# Patient Record
Sex: MALE | Race: BLACK OR AFRICAN AMERICAN | NOT HISPANIC OR LATINO | Employment: UNEMPLOYED | URBAN - METROPOLITAN AREA
[De-identification: names, ages, dates, MRNs, and addresses within clinical notes are randomized per-mention and may not be internally consistent; named-entity substitution may affect disease eponyms.]

---

## 2019-11-27 PROBLEM — R91.8 LUNG MASS: Status: ACTIVE | Noted: 2019-11-27

## 2019-11-27 PROBLEM — Z72.0 TOBACCO ABUSE: Status: ACTIVE | Noted: 2019-11-27

## 2019-11-27 PROBLEM — R45.851 SUICIDAL IDEATION: Status: ACTIVE | Noted: 2019-11-27

## 2019-11-27 PROBLEM — R91.8 MASS OF MIDDLE LOBE OF RIGHT LUNG: Status: ACTIVE | Noted: 2019-11-27

## 2019-11-27 PROBLEM — F10.10 ALCOHOL ABUSE: Status: ACTIVE | Noted: 2019-11-27

## 2019-11-28 VITALS
TEMPERATURE: 99 F | HEART RATE: 60 BPM | RESPIRATION RATE: 16 BRPM | OXYGEN SATURATION: 98 % | SYSTOLIC BLOOD PRESSURE: 169 MMHG | DIASTOLIC BLOOD PRESSURE: 90 MMHG

## 2019-11-28 PROBLEM — K43.9 VENTRAL HERNIA WITHOUT OBSTRUCTION OR GANGRENE: Status: ACTIVE | Noted: 2019-11-28

## 2019-11-28 PROBLEM — R79.89 ABNORMAL LIVER FUNCTION TESTS: Status: ACTIVE | Noted: 2019-11-28

## 2019-11-28 PROBLEM — D69.6 THROMBOCYTOPENIA: Status: ACTIVE | Noted: 2019-11-28

## 2019-11-29 VITALS
RESPIRATION RATE: 16 BRPM | OXYGEN SATURATION: 100 % | HEART RATE: 71 BPM | SYSTOLIC BLOOD PRESSURE: 155 MMHG | DIASTOLIC BLOOD PRESSURE: 82 MMHG | TEMPERATURE: 98 F

## 2019-11-29 VITALS
DIASTOLIC BLOOD PRESSURE: 89 MMHG | HEART RATE: 61 BPM | SYSTOLIC BLOOD PRESSURE: 161 MMHG | RESPIRATION RATE: 16 BRPM | TEMPERATURE: 99 F | OXYGEN SATURATION: 95 %

## 2019-11-29 VITALS
OXYGEN SATURATION: 100 % | DIASTOLIC BLOOD PRESSURE: 85 MMHG | HEART RATE: 69 BPM | TEMPERATURE: 98 F | SYSTOLIC BLOOD PRESSURE: 136 MMHG | RESPIRATION RATE: 14 BRPM

## 2019-11-30 VITALS
OXYGEN SATURATION: 100 % | HEART RATE: 66 BPM | TEMPERATURE: 98 F | SYSTOLIC BLOOD PRESSURE: 159 MMHG | DIASTOLIC BLOOD PRESSURE: 82 MMHG | RESPIRATION RATE: 16 BRPM

## 2019-11-30 VITALS
SYSTOLIC BLOOD PRESSURE: 150 MMHG | DIASTOLIC BLOOD PRESSURE: 74 MMHG | HEART RATE: 59 BPM | RESPIRATION RATE: 14 BRPM | OXYGEN SATURATION: 100 % | HEART RATE: 75 BPM | TEMPERATURE: 98 F | OXYGEN SATURATION: 100 % | RESPIRATION RATE: 14 BRPM | DIASTOLIC BLOOD PRESSURE: 70 MMHG | TEMPERATURE: 98 F | SYSTOLIC BLOOD PRESSURE: 134 MMHG

## 2019-11-30 VITALS
TEMPERATURE: 98 F | OXYGEN SATURATION: 98 % | SYSTOLIC BLOOD PRESSURE: 173 MMHG | DIASTOLIC BLOOD PRESSURE: 85 MMHG | HEART RATE: 57 BPM | RESPIRATION RATE: 16 BRPM

## 2019-11-30 VITALS
RESPIRATION RATE: 15 BRPM | OXYGEN SATURATION: 100 % | SYSTOLIC BLOOD PRESSURE: 158 MMHG | TEMPERATURE: 98 F | DIASTOLIC BLOOD PRESSURE: 84 MMHG | HEART RATE: 64 BPM

## 2019-11-30 VITALS
DIASTOLIC BLOOD PRESSURE: 76 MMHG | TEMPERATURE: 98 F | OXYGEN SATURATION: 99 % | RESPIRATION RATE: 19 BRPM | SYSTOLIC BLOOD PRESSURE: 143 MMHG | HEART RATE: 75 BPM

## 2019-11-30 VITALS
HEART RATE: 65 BPM | SYSTOLIC BLOOD PRESSURE: 153 MMHG | OXYGEN SATURATION: 99 % | DIASTOLIC BLOOD PRESSURE: 82 MMHG | RESPIRATION RATE: 14 BRPM | TEMPERATURE: 99 F

## 2019-12-01 VITALS
HEART RATE: 64 BPM | RESPIRATION RATE: 16 BRPM | SYSTOLIC BLOOD PRESSURE: 150 MMHG | TEMPERATURE: 98 F | DIASTOLIC BLOOD PRESSURE: 84 MMHG

## 2019-12-01 VITALS
OXYGEN SATURATION: 100 % | RESPIRATION RATE: 14 BRPM | DIASTOLIC BLOOD PRESSURE: 86 MMHG | HEART RATE: 69 BPM | TEMPERATURE: 98 F | SYSTOLIC BLOOD PRESSURE: 153 MMHG

## 2019-12-01 VITALS
HEART RATE: 69 BPM | OXYGEN SATURATION: 100 % | RESPIRATION RATE: 16 BRPM | TEMPERATURE: 97 F | DIASTOLIC BLOOD PRESSURE: 83 MMHG | SYSTOLIC BLOOD PRESSURE: 152 MMHG

## 2020-03-03 PROBLEM — R07.9 CHEST PAIN: Status: ACTIVE | Noted: 2020-03-03

## 2020-03-03 PROBLEM — F10.20 ALCOHOL DEPENDENCE: Status: ACTIVE | Noted: 2020-03-03

## 2020-03-03 PROBLEM — I26.99 ACUTE PULMONARY EMBOLISM: Status: ACTIVE | Noted: 2020-03-03

## 2020-03-03 PROBLEM — F17.200 TOBACCO DEPENDENCE: Status: ACTIVE | Noted: 2020-03-03

## 2020-03-06 PROBLEM — T78.3XXA ANGIOEDEMA: Status: ACTIVE | Noted: 2020-03-06

## 2020-03-07 PROBLEM — Z75.8 DISCHARGE PLANNING ISSUES: Status: ACTIVE | Noted: 2020-03-07

## 2021-02-10 PROBLEM — I26.99 PULMONARY EMBOLI: Status: RESOLVED | Noted: 2021-02-10 | Resolved: 2021-02-10

## 2021-02-10 PROBLEM — I26.99 PULMONARY EMBOLI: Status: ACTIVE | Noted: 2021-02-10

## 2021-02-10 PROBLEM — C34.91 ADENOCARCINOMA, LUNG, RIGHT: Status: ACTIVE | Noted: 2021-02-10

## 2021-02-10 PROBLEM — Z91.199 NONCOMPLIANCE WITH TREATMENT PLAN: Status: ACTIVE | Noted: 2021-02-10

## 2024-03-25 PROBLEM — J45.909 ASTHMATIC BRONCHITIS: Status: ACTIVE | Noted: 2024-03-25

## 2024-03-26 ENCOUNTER — DOCUMENTATION ONLY (OUTPATIENT)
Dept: ADMINISTRATIVE | Facility: OTHER | Age: 59
End: 2024-03-26

## 2024-03-26 NOTE — PROGRESS NOTES
RADIATION ONCOLOGY    58AAM w/ h/o tobacco and EtOH abuse diagnosed w/ RML NSCL adenoCa s/p Bronch/EBUS (3/20) w/o treatment, now p/w inc SOB and Rt shoulder/upper chest pain x months, worse on day of admission.   Also w/ h/o PE, treated w/ Eliquis, since discontinued 2/2 hemoptysis.  CTA-C w/o evidence of PE w/ increased size of primary lung mass w/ RML airway obliteration.  CT head (-) CNS mets.    Pt now presenting w/ obvious local and regional disease progression 2/2 not having sought treatment at time of original diagnosis >4years ago.  Etiology of pain unclear but worsening respiratory status likely due to enlarging mass and airway obstruction.  Pt indicates he is now willing to pursue active treatment of his malignancy    No role for urgent inpatient EBRT. Recommend staging as outpt w/ PET-CT simulation.  If no evidence of distant disease patient like candidate for course of definitive concurrent chemo-IMRT.  If evidence of distant metastases, would recommend upfront short course of palliative EBRT to address proximal airway obstruction and associated SOB/JEFFERY and risk for associated complications (ie. post-obstructive PNA), followed by palliative chemo.    All available records, including imaging, reviewed.  Recommendations and rationale for such d/w pt at bedside, all ?'s answered, and business card provided.    Please call [facility] at time of discharge to arrange outpt f/u and staging PET sim.  Appreciate consult. D/w Dr. Amaya.  Please call w/ any ?'s    -RSB